# Patient Record
Sex: FEMALE | Race: WHITE | URBAN - METROPOLITAN AREA
[De-identification: names, ages, dates, MRNs, and addresses within clinical notes are randomized per-mention and may not be internally consistent; named-entity substitution may affect disease eponyms.]

---

## 2019-07-02 ENCOUNTER — Encounter (OUTPATIENT)
Dept: URBAN - METROPOLITAN AREA CLINIC 71 | Facility: CLINIC | Age: 74
End: 2019-07-02
Payer: COMMERCIAL

## 2019-07-02 PROCEDURE — 92014 COMPRE OPH EXAM EST PT 1/>: CPT | Performed by: OPTOMETRIST

## 2019-07-09 ENCOUNTER — Encounter (OUTPATIENT)
Dept: URBAN - METROPOLITAN AREA CLINIC 71 | Facility: CLINIC | Age: 74
End: 2019-07-09
Payer: MEDICARE

## 2019-07-09 PROCEDURE — 92014 COMPRE OPH EXAM EST PT 1/>: CPT | Performed by: OPHTHALMOLOGY

## 2019-07-09 PROCEDURE — 92250 FUNDUS PHOTOGRAPHY W/I&R: CPT | Performed by: OPHTHALMOLOGY

## 2019-07-16 ENCOUNTER — Encounter (OUTPATIENT)
Dept: URBAN - METROPOLITAN AREA CLINIC 71 | Facility: CLINIC | Age: 74
End: 2019-07-16
Payer: COMMERCIAL

## 2019-07-16 PROCEDURE — 92310 CONTACT LENS FITTING OU: CPT | Performed by: OPTOMETRIST

## 2020-08-06 ENCOUNTER — OFFICE VISIT (OUTPATIENT)
Dept: URBAN - METROPOLITAN AREA CLINIC 71 | Facility: CLINIC | Age: 75
End: 2020-08-06
Payer: MEDICARE

## 2020-08-06 DIAGNOSIS — H25.13 AGE-RELATED NUCLEAR CATARACT, BILATERAL: ICD-10-CM

## 2020-08-06 DIAGNOSIS — H43.813 VITREOUS DEGENERATION, BILATERAL: Primary | ICD-10-CM

## 2020-08-06 DIAGNOSIS — H04.123 DRY EYE SYNDROME OF BILATERAL LACRIMAL GLANDS: ICD-10-CM

## 2020-08-06 PROCEDURE — 92014 COMPRE OPH EXAM EST PT 1/>: CPT | Performed by: OPHTHALMOLOGY

## 2020-08-06 PROCEDURE — 92250 FUNDUS PHOTOGRAPHY W/I&R: CPT | Performed by: OPHTHALMOLOGY

## 2020-08-06 ASSESSMENT — INTRAOCULAR PRESSURE
OD: 10
OS: 17

## 2020-08-06 ASSESSMENT — KERATOMETRY
OD: 39.63
OS: 41.00

## 2020-08-06 NOTE — IMPRESSION/PLAN
Impression: Dry eye syndrome of bilateral lacrimal glands: H04.123. Stable.  Plan: Recommend artificial tears OU as needed,

## 2020-08-06 NOTE — IMPRESSION/PLAN
Impression: Vitreous degeneration, bilateral: H43.813. OPTOS ordered. Stable. Plan: The PVD is stable, and there is no evidence of a retinal tear or detachment on dilated exam.
The patient was advised to call immediately with any changes to [address] or increase in symptoms. Patient to return annually for dilated exams with OPTOS.

## 2020-08-20 ENCOUNTER — OFFICE VISIT (OUTPATIENT)
Dept: URBAN - METROPOLITAN AREA CLINIC 71 | Facility: CLINIC | Age: 75
End: 2020-08-20
Payer: COMMERCIAL

## 2020-08-20 DIAGNOSIS — H52.223 REGULAR ASTIGMATISM, BILATERAL: Primary | ICD-10-CM

## 2020-08-20 PROCEDURE — 92012 INTRM OPH EXAM EST PATIENT: CPT | Performed by: OPTOMETRIST

## 2020-08-20 ASSESSMENT — VISUAL ACUITY
OS: 20/20
OD: 20/20

## 2020-08-20 ASSESSMENT — INTRAOCULAR PRESSURE
OS: 10
OD: 10

## 2020-08-20 ASSESSMENT — KERATOMETRY
OD: 40.00
OS: 41.13

## 2020-08-20 NOTE — IMPRESSION/PLAN
Impression: Regular astigmatism, bilateral: H52.223. Plan: Astigmatism causes blurred vision due to either an irregularly shaped cornea or the curvature of the lens. Small amounts of astigmatism do not need to be treated, but larger amounts can cause visual distortion, blurred vision, eye strain and headaches. New glasses rx given to patient today. Patient to return on an annual basis for routine vision exams.

## 2022-02-08 ENCOUNTER — OFFICE VISIT (OUTPATIENT)
Dept: URBAN - METROPOLITAN AREA CLINIC 71 | Facility: CLINIC | Age: 77
End: 2022-02-08
Payer: MEDICARE

## 2022-02-08 PROCEDURE — 99213 OFFICE O/P EST LOW 20 MIN: CPT | Performed by: OPHTHALMOLOGY

## 2022-02-08 ASSESSMENT — INTRAOCULAR PRESSURE
OS: 13
OD: 13

## 2022-02-08 NOTE — IMPRESSION/PLAN
Impression: Dry eye syndrome of bilateral lacrimal glands: H04.123. Stable.  Plan: Recommend artificial tears OU as needed

## 2022-02-08 NOTE — IMPRESSION/PLAN
Impression: Vitreous degeneration, bilateral: H43.813. OPTOS ordered today and reviewed. No holes or tears seen today. Stable. Plan: Return annually for dilated exams with OCT testing. Contact office if symptoms worsen, including increased floaters, flashing light, loss of vision or a black curtain blocking your field of vision.

## 2022-02-10 ENCOUNTER — OFFICE VISIT (OUTPATIENT)
Dept: URBAN - METROPOLITAN AREA CLINIC 71 | Facility: CLINIC | Age: 77
End: 2022-02-10
Payer: COMMERCIAL

## 2022-02-10 DIAGNOSIS — H52.4 PRESBYOPIA: Primary | ICD-10-CM

## 2022-02-10 PROCEDURE — 92012 INTRM OPH EXAM EST PATIENT: CPT | Performed by: OPTOMETRIST

## 2022-02-10 ASSESSMENT — INTRAOCULAR PRESSURE
OS: 15
OD: 14

## 2022-02-10 ASSESSMENT — VISUAL ACUITY
OS: 20/20
OD: 20/20

## 2022-02-10 NOTE — IMPRESSION/PLAN
Impression: Presbyopia: H52.4. Worsening. New glasses rx given to patient today. Discussed adaptation period with a new glasses rx. Plan: Instructed patient to call if there are any issues with the new glasses rx. Patient to return annually for routine vision exams.

## 2023-04-05 ENCOUNTER — OFFICE VISIT (OUTPATIENT)
Dept: URBAN - METROPOLITAN AREA CLINIC 71 | Facility: CLINIC | Age: 78
End: 2023-04-05
Payer: MEDICARE

## 2023-04-05 DIAGNOSIS — H04.123 DRY EYE SYNDROME OF BILATERAL LACRIMAL GLANDS: ICD-10-CM

## 2023-04-05 DIAGNOSIS — H43.813 VITREOUS DEGENERATION, BILATERAL: Primary | ICD-10-CM

## 2023-04-05 DIAGNOSIS — H25.13 AGE-RELATED NUCLEAR CATARACT, BILATERAL: ICD-10-CM

## 2023-04-05 PROCEDURE — 92134 CPTRZ OPH DX IMG PST SGM RTA: CPT | Performed by: OPHTHALMOLOGY

## 2023-04-05 PROCEDURE — 99213 OFFICE O/P EST LOW 20 MIN: CPT | Performed by: OPHTHALMOLOGY

## 2023-04-05 RX ORDER — SODIUM BICARBONATE TAB 650 MG 650 MG
650 MG TAB ORAL
Qty: 0 | Refills: 0 | Status: ACTIVE
Start: 2023-04-05

## 2023-04-05 RX ORDER — ATORVASTATIN CALCIUM 10 MG/1
10 MG TABLET, FILM COATED ORAL
Qty: 0 | Refills: 0 | Status: ACTIVE
Start: 2023-04-05

## 2023-04-05 RX ORDER — ACETAMINOPHEN 500 MG
TABLET ORAL
Qty: 0 | Refills: 0 | Status: ACTIVE
Start: 2023-04-05

## 2023-04-05 RX ORDER — CLOPIDOGREL 75 MG/1
75 MG TABLET, FILM COATED ORAL
Qty: 0 | Refills: 0 | Status: ACTIVE
Start: 2023-04-05

## 2023-04-05 RX ORDER — PANTOPRAZOLE SODIUM 40 MG/1
40 MG TABLET, DELAYED RELEASE ORAL
Qty: 0 | Refills: 0 | Status: ACTIVE
Start: 2023-04-05

## 2023-04-05 RX ORDER — MONTELUKAST SODIUM 10 MG/1
10 MG TABLET ORAL
Qty: 0 | Refills: 0 | Status: ACTIVE
Start: 2023-04-05

## 2023-04-05 ASSESSMENT — INTRAOCULAR PRESSURE
OD: 12
OS: 13

## 2023-04-05 NOTE — IMPRESSION/PLAN
Impression: Vitreous degeneration, bilateral: H43.813. OCT ordered and reviewed today. No holes or tears seen today. Stable. Plan: Return annually for dilated exams with OCT testing. Contact office if symptoms worsen, including increased floaters, flashing light, loss of vision or a black curtain blocking your field of vision.

## 2023-04-05 NOTE — IMPRESSION/PLAN
Impression: Age-related nuclear cataract, bilateral: H25.13. Vision is remaining stable OU. Plan: No treatment currently recommended. The patient will monitor vision changes and contact us with any decrease in vision.

## 2023-04-05 NOTE — IMPRESSION/PLAN
Impression: Dry eye syndrome of bilateral lacrimal glands: H04.123. Remaining stable.  Plan: Recommend artificial tears OU as needed

## 2023-04-12 ENCOUNTER — OFFICE VISIT (OUTPATIENT)
Dept: URBAN - METROPOLITAN AREA CLINIC 71 | Facility: CLINIC | Age: 78
End: 2023-04-12
Payer: COMMERCIAL

## 2023-04-12 DIAGNOSIS — H52.4 PRESBYOPIA: Primary | ICD-10-CM

## 2023-04-12 DIAGNOSIS — H43.813 VITREOUS DEGENERATION, BILATERAL: ICD-10-CM

## 2023-04-12 DIAGNOSIS — H25.13 AGE-RELATED NUCLEAR CATARACT, BILATERAL: ICD-10-CM

## 2023-04-12 PROCEDURE — 92012 INTRM OPH EXAM EST PATIENT: CPT | Performed by: OPTOMETRIST

## 2023-04-12 ASSESSMENT — INTRAOCULAR PRESSURE
OS: 12
OD: 9

## 2023-04-12 ASSESSMENT — VISUAL ACUITY
OD: 20/20
OS: 20/20

## 2023-04-12 NOTE — IMPRESSION/PLAN
Impression: Age-related nuclear cataract, bilateral: H25.13. Patient feels like she is struggling with acuity but is not going forward with Cat sx Plan: Cataracts account for the patient's complaints. No treatment currently recommended. The patient will monitor vision changes and contact us with any decrease in vision.